# Patient Record
Sex: FEMALE | Race: WHITE | NOT HISPANIC OR LATINO | URBAN - METROPOLITAN AREA
[De-identification: names, ages, dates, MRNs, and addresses within clinical notes are randomized per-mention and may not be internally consistent; named-entity substitution may affect disease eponyms.]

---

## 2023-06-26 ENCOUNTER — APPOINTMENT (OUTPATIENT)
Dept: LAB | Facility: LAB | Age: 18
End: 2023-06-26
Payer: COMMERCIAL

## 2023-08-29 ENCOUNTER — E-VISIT (OUTPATIENT)
Dept: PRIMARY CARE | Facility: CLINIC | Age: 18
End: 2023-08-29
Payer: COMMERCIAL

## 2023-08-30 ENCOUNTER — E-VISIT (OUTPATIENT)
Dept: PRIMARY CARE | Facility: CLINIC | Age: 18
End: 2023-08-30
Payer: COMMERCIAL

## 2023-12-09 ENCOUNTER — TELEMEDICINE (OUTPATIENT)
Dept: PRIMARY CARE | Facility: CLINIC | Age: 18
End: 2023-12-09
Payer: COMMERCIAL

## 2023-12-09 DIAGNOSIS — L02.91 ABSCESS: Primary | ICD-10-CM

## 2023-12-09 PROCEDURE — 99213 OFFICE O/P EST LOW 20 MIN: CPT | Performed by: NURSE PRACTITIONER

## 2023-12-09 RX ORDER — BACITRACIN ZINC 500 UNIT/G
OINTMENT (GRAM) TOPICAL 2 TIMES DAILY
Qty: 14 G | Refills: 0 | Status: SHIPPED | OUTPATIENT
Start: 2023-12-09

## 2023-12-09 ASSESSMENT — ENCOUNTER SYMPTOMS
ACTIVITY CHANGE: 0
DIZZINESS: 0
FEVER: 0
LIGHT-HEADEDNESS: 0
HEADACHES: 0
SHORTNESS OF BREATH: 0

## 2023-12-09 NOTE — PROGRESS NOTES
"Subjective   Patient ID: Bhavna Krishnan is a 18 y.o. female who presents for pain to left nostril (Started on Tuesday/).    Sx onset Tuesday  Left inside nostril is painful, red  Denies drainage, fever  Has been using neosporin antibiotic ointment since Wednesday no improvement  Has \"messed with it\" and it has worsened             Review of Systems   Constitutional:  Negative for activity change and fever.   HENT:  Negative for congestion, nosebleeds and postnasal drip.         Sore in left nostril   Respiratory:  Negative for shortness of breath.    Cardiovascular:  Negative for chest pain.   Neurological:  Negative for dizziness, light-headedness and headaches.       Objective   There were no vitals taken for this visit.    Physical Exam  Constitutional:       Appearance: Normal appearance.      Comments: Pt location in [place] and consent obtained.   Telemedicine appropriate evaluation completed.  Unable to perform complete physical exam due to virtual visit, patient was visualized on interactive video.      Pulmonary:      Effort: Pulmonary effort is normal.   Neurological:      Mental Status: She is alert and oriented to person, place, and time.         Assessment/Plan   Diagnoses and all orders for this visit:  Abscess  -     bacitracin 500 unit/gram ointment; Apply topically 2 times a day. Apply to left nostril with Q-tip twice daily for 7 days  Clean with saline nasal spray twice daily before applying ointment. Avoid putting fingers in nose.  Wash hands often   If symptoms worsen or fever occurs seek urgent care  Follow up with PCP      "

## 2024-06-17 ENCOUNTER — E-VISIT (OUTPATIENT)
Dept: PRIMARY CARE | Facility: CLINIC | Age: 19
End: 2024-06-17
Payer: COMMERCIAL

## 2024-06-17 DIAGNOSIS — R30.0 DYSURIA: Primary | ICD-10-CM

## 2024-06-17 PROBLEM — N39.0 ACUTE UTI: Status: RESOLVED | Noted: 2024-06-17 | Resolved: 2024-06-17

## 2024-06-17 RX ORDER — CEPHALEXIN 500 MG/1
500 CAPSULE ORAL 2 TIMES DAILY
Qty: 10 CAPSULE | Refills: 0 | Status: SHIPPED | OUTPATIENT
Start: 2024-06-17 | End: 2024-06-22

## 2024-06-17 RX ORDER — NORETHINDRONE ACETATE AND ETHINYL ESTRADIOL, AND FERROUS FUMARATE 1.5-30(21)
1 KIT ORAL DAILY
COMMUNITY
Start: 2021-01-12

## 2024-06-17 NOTE — TELEPHONE ENCOUNTER
Patient presented for eVisit (non-face-to-face, patient-initiated communications through Epic online portal)    [unfilled]   Bhavna ELPIDIO Eulogio is a 19 y.o. year old female patient presenting for an eVisit   Chief Complaint   Patient presents with    Urinary Problem     Entered automatically based on patient selection in [facility].     Dysuria x 1 day. Hx UTIs. Burning sensation during urination, frequency, odor    Have you had similar symptoms in the past? Yes, I have had similar symptoms more than once before   When did your symptoms begin? 6/16/2024   Are you experiencing?    Pain while passing urine? Yes   Difficulty passing urine? No   Change in urine appearance or smell? Yes   When you have pain when passing urine:    Do you have a burning sensation? Yes   Does the pain feels like it is on the inside? Yes   Does the pain feels like it is on or near the outside? No   Do you have the urge to urinate more or less frequently than normal? More frequently   Do you have an exaggerated sensation of the need to pass urine? Yes, the sensation is exaggerated   What does your urine look like? An unusal smell   Anything else you would like to add? I have had UTIS in the past and it feels the same as this. Pain when urinating, burning, more frequent urgincy to urinate.         Patient Active Problem List   Diagnosis    Dysuria       Past Medical History:   Diagnosis Date    Acute UTI 06/17/2024      History reviewed. No pertinent surgical history.   No family history on file.   Social History     Tobacco Use    Smoking status: Not on file    Smokeless tobacco: Not on file   Substance Use Topics    Alcohol use: Not on file          Current Outpatient Medications:     Junel FE 1.5/30, 28, 1.5 mg-30 mcg (21)/75 mg (7) tablet, Take 1 tablet by mouth once daily., Disp: , Rfl:     bacitracin 500 unit/gram ointment, Apply topically 2 times a day. Apply to left nostril with Q-tip twice daily for 7 days, Disp: 14 g, Rfl: 0     cephalexin (Keflex) 500 mg capsule, Take 1 capsule (500 mg) by mouth 2 times a day for 5 days., Disp: 10 capsule, Rfl: 0         [unfilled]   Review of Systems:   Constitutional: Denies fever  HEENT: Denies ST, earache  CVS: Denies Chest pain  Pulmonary: Denies wheezing, SOB  GI: Denies N/V  : Denies dysuria  Musculoskeletal:  Denies myalgia  Neuro: Denies focal weakness or numbness.  Skin: Denies Rashes.  *Review of Systems is negative unless otherwise mentioned in HPI or ROS above.     [unfilled]   VITALS  Pt does not have vitals available at time of visit.    [unfilled]   Unable to perform physical exam in virtual platform    [unfilled]   Problem List Items Addressed This Visit       Dysuria - Primary    Relevant Medications    cephalexin (Keflex) 500 mg capsule          DISCHARGE    Thank you for trusting me to be a part of your healthcare.    Take care!     Mary Fabian PA-C  [facility]    Total time spent on review of patient's communications for this eVisit, as well as review of clinical information including assessment of patient's current issue, interaction with clinical staff as needed, development of management plans including ordering prescriptions and/or tests as well as communicating with the patient was:  11 minutes

## 2024-08-28 ENCOUNTER — E-VISIT (OUTPATIENT)
Dept: PRIMARY CARE | Facility: CLINIC | Age: 19
End: 2024-08-28

## 2024-08-28 DIAGNOSIS — S50.869A INSECT BITE OF FOREARM, UNSPECIFIED LATERALITY, INITIAL ENCOUNTER: Primary | ICD-10-CM
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
## 2024-08-28 RX ORDER — DOXYCYCLINE 100 MG/1
100 CAPSULE ORAL 2 TIMES DAILY
Qty: 14 CAPSULE | Refills: 0 | Status: SHIPPED | OUTPATIENT
Start: 2024-08-28 | End: 2024-09-04

## 2024-08-28 NOTE — TELEPHONE ENCOUNTER
Rash: Patient complains of rash involving the  unclear from picture but lower  arm and unclear from picture if right or left upper leg. Rash started 5 days ago. Appearance of rash at onset: Color of lesion(s): red. Rash has changed over time.  Discomfort associated with rash: is pruritic.  Associated symptoms: none. Denies: fever. Patient has not had previous evaluation of rash. Patient has had previous treatment, with antihistamine both oral and cream.  Response to treatment: erythema is still growing. Patient has not had contacts with similar rash. Patient has identified precipitant. Patient has had new exposures she was bit by an insect multiple days ago (soaps, lotions, laundry detergents, foods, medications, plants, insects or animals.)   See attached photos for documentation of lesion.   Bhavna was seen today for rash.  Diagnoses and all orders for this visit:  Insect bite of forearm, unspecified laterality, initial encounter  -     doxycycline (Vibramycin) 100 mg capsule; Take 1 capsule (100 mg) by mouth 2 times a day for 7 days. Take with at least 8 ounces (large glass) of water, do not lie down for 30 minutes after

## 2024-10-16 PROBLEM — N39.0 ACUTE URINARY TRACT INFECTION: Status: ACTIVE | Noted: 2024-06-17

## 2024-10-16 PROBLEM — I10 ESSENTIAL (PRIMARY) HYPERTENSION: Status: ACTIVE | Noted: 2023-03-27

## 2024-10-18 ENCOUNTER — APPOINTMENT (OUTPATIENT)
Dept: OBSTETRICS AND GYNECOLOGY | Facility: CLINIC | Age: 19
End: 2024-10-18
Payer: COMMERCIAL

## 2024-10-18 ENCOUNTER — APPOINTMENT (OUTPATIENT)
Dept: PRIMARY CARE | Facility: CLINIC | Age: 19
End: 2024-10-18
Payer: COMMERCIAL

## 2024-10-18 DIAGNOSIS — Z00.00 ROUTINE GENERAL MEDICAL EXAMINATION AT A HEALTH CARE FACILITY: Primary | ICD-10-CM

## 2024-10-18 DIAGNOSIS — I10 ESSENTIAL (PRIMARY) HYPERTENSION: ICD-10-CM

## 2024-11-06 PROCEDURE — RXMED WILLOW AMBULATORY MEDICATION CHARGE

## 2024-11-11 ENCOUNTER — PHARMACY VISIT (OUTPATIENT)
Dept: PHARMACY | Facility: CLINIC | Age: 19
End: 2024-11-11
Payer: COMMERCIAL

## 2025-02-07 ENCOUNTER — TELEPHONE (OUTPATIENT)
Dept: OBSTETRICS AND GYNECOLOGY | Facility: CLINIC | Age: 20
End: 2025-02-07
Payer: COMMERCIAL

## 2025-02-19 ENCOUNTER — APPOINTMENT (OUTPATIENT)
Dept: OBSTETRICS AND GYNECOLOGY | Facility: CLINIC | Age: 20
End: 2025-02-19
Payer: COMMERCIAL

## 2025-03-14 ENCOUNTER — APPOINTMENT (OUTPATIENT)
Dept: OBSTETRICS AND GYNECOLOGY | Facility: CLINIC | Age: 20
End: 2025-03-14
Payer: COMMERCIAL

## 2025-03-14 VITALS
WEIGHT: 174 LBS | BODY MASS INDEX: 30.83 KG/M2 | HEIGHT: 63 IN | SYSTOLIC BLOOD PRESSURE: 110 MMHG | DIASTOLIC BLOOD PRESSURE: 72 MMHG

## 2025-03-14 DIAGNOSIS — Z30.41 ENCOUNTER FOR SURVEILLANCE OF CONTRACEPTIVE PILLS: ICD-10-CM

## 2025-03-14 DIAGNOSIS — Z01.419 WELL WOMAN EXAM WITH ROUTINE GYNECOLOGICAL EXAM: Primary | ICD-10-CM

## 2025-03-14 DIAGNOSIS — Z12.4 CERVICAL CANCER SCREENING: ICD-10-CM

## 2025-03-14 PROBLEM — I10 ESSENTIAL (PRIMARY) HYPERTENSION: Status: RESOLVED | Noted: 2023-03-27 | Resolved: 2025-03-14

## 2025-03-14 PROCEDURE — 99385 PREV VISIT NEW AGE 18-39: CPT | Performed by: NURSE PRACTITIONER

## 2025-03-14 PROCEDURE — 1036F TOBACCO NON-USER: CPT | Performed by: NURSE PRACTITIONER

## 2025-03-14 PROCEDURE — 3008F BODY MASS INDEX DOCD: CPT | Performed by: NURSE PRACTITIONER

## 2025-03-14 RX ORDER — NORETHINDRONE ACETATE AND ETHINYL ESTRADIOL, AND FERROUS FUMARATE 1.5-30(21)
1 KIT ORAL DAILY
Qty: 84 TABLET | Refills: 3 | Status: SHIPPED | OUTPATIENT
Start: 2025-03-14

## 2025-03-14 ASSESSMENT — ENCOUNTER SYMPTOMS
RESPIRATORY NEGATIVE: 1
GASTROINTESTINAL NEGATIVE: 1
HEMATOLOGIC/LYMPHATIC NEGATIVE: 1
EYES NEGATIVE: 1
MUSCULOSKELETAL NEGATIVE: 1
CONSTITUTIONAL NEGATIVE: 1
PSYCHIATRIC NEGATIVE: 1
ENDOCRINE NEGATIVE: 1
ALLERGIC/IMMUNOLOGIC NEGATIVE: 1
NEUROLOGICAL NEGATIVE: 1
CARDIOVASCULAR NEGATIVE: 1

## 2025-03-14 NOTE — PROGRESS NOTES
"Chief Complaint    NEW PT ANNUAL        HPI    PAP NEVER  STD SCREEN NEVER  GARDASIL X2        Last edited by Caden Santacruz MA on 3/14/2025 10:05 AM.         20 y.o. G0 female presents for annual well woman exam.   She is new with the practice.  She is on COCs for contraception. Doing well on the pill, Junel 1.5/30. Needs refills  Patient's menstrual cycles are regular every 28 days, lasting 5 days. Denies abnormal bleeding.  She is sexually active with 1 partner(s) whom she has been with for 5 years. Denies dyspareunia.   Condoms: Denies  Declines STD testing today. No hx. of STDs.  She denies any breast concerns.   No pap to date. Gardasil: x2  Denies pelvic pain.  Denies abnormal vaginal symptoms.  Denies urinary or bowel concerns.   She is non-smoker  PMH: None  Family h/o breast cancer: PGM, maternal aunt  Family h/o GYN cancer: None  Family h/o colon cancer: None  Works in [employer] at  [place].     /72   Ht 1.6 m (5' 3\")   Wt 78.9 kg (174 lb)   LMP 02/26/2025   BMI 30.82 kg/m²      Current Outpatient Medications   Medication Instructions    bacitracin 500 unit/gram ointment Topical, 2 times daily, Apply to left nostril with Q-tip twice daily for 7 days    Junel FE 1.5/30, 28, 1.5 mg-30 mcg (21)/75 mg (7) tablet 1 tablet, oral, Daily        Review of Systems   Constitutional: Negative.    HENT: Negative.     Eyes: Negative.    Respiratory: Negative.     Cardiovascular: Negative.    Gastrointestinal: Negative.    Endocrine: Negative.    Genitourinary: Negative.    Musculoskeletal: Negative.    Skin: Negative.    Allergic/Immunologic: Negative.    Neurological: Negative.    Hematological: Negative.    Psychiatric/Behavioral: Negative.     All other systems reviewed and are negative.       Physical Exam  Constitutional:       Appearance: Normal appearance.   HENT:      Head: Normocephalic.      Nose: Nose normal.   Cardiovascular:      Rate and Rhythm: Normal rate and regular rhythm.   Pulmonary:      " Effort: Pulmonary effort is normal.      Breath sounds: Normal breath sounds.   Chest:   Breasts:     Right: Normal.      Left: Normal.   Abdominal:      General: Abdomen is flat.      Palpations: Abdomen is soft.   Genitourinary:     General: Normal vulva.      Vagina: Normal.      Cervix: Normal.      Uterus: Normal.       Adnexa: Right adnexa normal and left adnexa normal.      Rectum: Normal.      Comments: Pap collected  Musculoskeletal:         General: Normal range of motion.      Cervical back: Normal range of motion and neck supple.   Skin:     General: Skin is warm and dry.   Neurological:      Mental Status: She is alert.   Psychiatric:         Mood and Affect: Mood normal.         Behavior: Behavior normal.          Assessment/Plan:   1. Well woman exam with routine gynecological exam (Primary)  -Pap test collected today.   -Declines STD screening today.   -Self breast awareness exams reviewed.  -Advised yearly well woman exams.   -Follow up sooner if needed.     2. Cervical cancer screening  - THINPREP PAP    3. Encounter for surveillance of contraceptive pills  -Refills: Junel FE 1.5/30, 28, 1.5 mg-30 mcg (21)/75 mg (7) tablet; Take 1 tablet by mouth once daily.  Dispense: 90 tablet; Refill: 3   -Correct pill use, ACHES reviewed. Counseled condom use.

## 2025-03-21 PROCEDURE — RXMED WILLOW AMBULATORY MEDICATION CHARGE

## 2025-03-25 ENCOUNTER — PHARMACY VISIT (OUTPATIENT)
Dept: PHARMACY | Facility: CLINIC | Age: 20
End: 2025-03-25
Payer: COMMERCIAL

## 2025-03-29 LAB
CYTOLOGY CMNT CVX/VAG CYTO-IMP: NORMAL
LAB AP HPV GENOTYPE QUESTION: YES
LAB AP HPV HR: NORMAL
LABORATORY COMMENT REPORT: NORMAL
LMP START DATE: NORMAL
PATH REPORT.TOTAL CANCER: NORMAL

## 2025-06-10 PROCEDURE — RXMED WILLOW AMBULATORY MEDICATION CHARGE

## 2025-06-17 ENCOUNTER — PHARMACY VISIT (OUTPATIENT)
Dept: PHARMACY | Facility: CLINIC | Age: 20
End: 2025-06-17
Payer: COMMERCIAL

## 2025-07-25 ENCOUNTER — E-VISIT (OUTPATIENT)
Dept: PRIMARY CARE | Facility: CLINIC | Age: 20
End: 2025-07-25
Payer: COMMERCIAL

## 2025-07-25 DIAGNOSIS — J01.90 ACUTE NON-RECURRENT SINUSITIS, UNSPECIFIED LOCATION: Primary | ICD-10-CM

## 2025-07-25 RX ORDER — AMOXICILLIN AND CLAVULANATE POTASSIUM 875; 125 MG/1; MG/1
1 TABLET, FILM COATED ORAL 2 TIMES DAILY
Qty: 14 TABLET | Refills: 0 | Status: SHIPPED | OUTPATIENT
Start: 2025-07-25 | End: 2025-08-01

## 2025-07-25 ASSESSMENT — LIFESTYLE VARIABLES: HISTORY_OF_SMOKING: I HAVE NEVER SMOKED

## 2025-07-25 NOTE — TELEPHONE ENCOUNTER
Concern for sinusitis  Never smoker    No severe neck pain  No photophobia  No N,V,D  No fever  No vision changes  No SOB    Pertinent positives:  Throat pain  Congested  Headache  Cough  Purulent colored nasal drainage    FOR A WEEK    Hi!   -I am not allergic to any meds   -I take birth control daily   -My symptoms started 7/17   -I do not have seasonal allergies   -My last period was last week. Started 7-13 and ended 7-20    Other ROS (-)    It has been 8 days and staying the same, so it could still be viral at this point.    Most infectious sinus issues start out as a viral upper respiratory infection.      This infection can have the exact same symptoms as a bacterial sinus infection.      The major difference is the duration of the symptoms.      Viruses often start to improve as early as day 3 or as late    as day 10 of symptoms.      Sometimes, a bacteria enters and will infect the fluid from the viral infection,  This is commonly know as BACTERIAL SINUSITIS.   This happens around days 7-10+.      **If symptoms are WORSE by days #7 or you have NO IMPROVEMENT in symptoms by day #10, we may consider a secondary bacterial infection as a cause for your symptoms worsening or not improving and may prescribe an antibiotic.    Your symptoms are consistent with a VIRAL upper respiratory infection.  At this point, no antibiotics are necessary as they will not work against viruses.      I will put a prescription for augmentin into your pharmacy ([facility] [place]) in the event that symptoms worsen in the next 2-3 days.      One of the biggest issues with taking antibiotics is that, not only do they kill of ' bad bacteria' but they also kill off all the ' good bacteria' in our gut and it can take 3 months for it to return to normal.    Bacteria in your gut help break down certain complex carbohydrates and dietary fibers that you can't break down on your own. They produce short-chain fatty acids -- an important  nutrient -- as byproducts.   They also provide the enzymes necessary to synthesize certain vitamins, including B1, B9, B12 and K    If it has not been 7-10 days of symptoms, antibiotics are not helpful because there is not ' bad bacteria' present to kill.    Over the counter medications can help you feel better until the symptoms resolve.    Over-the-counter cold and cough medications    Take Mucinex for cough, drink plenty of fluids with this medication and will help break up congestion making it easier to cough up    For cough can use honey (children ages 1 and up) in hot tea or hot water. I recommend putting this in an insulated cup and carrying it around throughout the day to sip on.  Have it at your bedside at night in case you wake up coughing.  You can also use honey cough drops (adults and older children).    Recommend nasal saline for use in children and adults.  Neti Corona can also be helpful.  (Never used tap water and a Neti pot.  Use distilled water.)    If you have plugged up congested ears or the feeling of fluid in your ears, you can use an over-the-counter nasal steroid spray like fluticasone (brand name Flonase) use 2 sprays into each nostril once or twice a day for 7 days.  This will help open up the eustachian tubes and allow the fluid to drain out of your ears.    Sleeping with your head/chest elevated can help with sinus drainage.    Adults only-can use nasal decongestant (like Afrin) at bedtime to open nasal passages so you can breathe through your nose while you sleep; avoid using for longer than 3 days as this medication can become addicting.  Do not use if you have high blood pressure or high heart rate.    For severe pain or fever in adult-Tylenol (2 extra strength) or ibuprofen (3-4 tabs equals 600 to 800 mg) alternating as needed for pain.  Tylenol doses should be 6 to 8 hours apart and ibuprofen doses should be 6 to 8 hours apart.      Common cold medications for adults  explained:    **Cold medications for children are not recommended-only Tylenol, Motrin, honey (only for children older than 1 year), cool-mist humidifier, and nasal saline spray are recommended for children.    Mucinex-(generic name guaifenesin)-is an expectorant.  This will thin out all the thick mucus.  Must drink plenty of liquids for this medicine to work.    Dextromethorphan (brand name Delsym or DM)-this medicine is a cough suppressant    Honey in hot water or tea-this is a natural cough suppressant    Decongestant nasal spray- (eg: Afrin) use for temporary relief of nasal congestion-best when used at bedtime to open up nasal passages so that you are not forced to mouth breathe overnight.    Sudafed (generic name pseudoephedrine-this must be bought from the pharmacist) DO NOT use this medicine if you have high blood pressure as it can raise your blood pressure higher.  Do not use if you have any irregular heart rate.  This medicine can help clear congestion in your sinuses.    Let me know if you have any questions!

## 2025-08-31 ENCOUNTER — E-VISIT (OUTPATIENT)
Dept: PRIMARY CARE | Facility: CLINIC | Age: 20
End: 2025-08-31
Payer: COMMERCIAL

## 2025-08-31 PROBLEM — J01.90 ACUTE SINUSITIS: Status: ACTIVE | Noted: 2025-08-31

## 2025-08-31 ASSESSMENT — LIFESTYLE VARIABLES: HISTORY_OF_SMOKING: I HAVE NEVER SMOKED
